# Patient Record
Sex: FEMALE | Employment: OTHER | URBAN - METROPOLITAN AREA
[De-identification: names, ages, dates, MRNs, and addresses within clinical notes are randomized per-mention and may not be internally consistent; named-entity substitution may affect disease eponyms.]

---

## 2017-11-26 ENCOUNTER — TELEPHONE (OUTPATIENT)
Dept: INTERNAL MEDICINE CLINIC | Facility: CLINIC | Age: 57
End: 2017-11-26

## 2017-11-26 NOTE — TELEPHONE ENCOUNTER
Please call patient. 1. Last visit in our practice was in 2012. She moved to [place] prior to her last visit w/ me, but came up here to see me for an evaluation but hasn't seen me since.   2. I assume that she has been working with a physician in her ho